# Patient Record
Sex: FEMALE | Race: WHITE
[De-identification: names, ages, dates, MRNs, and addresses within clinical notes are randomized per-mention and may not be internally consistent; named-entity substitution may affect disease eponyms.]

---

## 2018-01-01 ENCOUNTER — HOSPITAL ENCOUNTER (EMERGENCY)
Dept: HOSPITAL 62 - ER | Age: 33
Discharge: HOME | End: 2018-01-01
Payer: MEDICAID

## 2018-01-01 VITALS — SYSTOLIC BLOOD PRESSURE: 140 MMHG | DIASTOLIC BLOOD PRESSURE: 70 MMHG

## 2018-01-01 DIAGNOSIS — R05: ICD-10-CM

## 2018-01-01 DIAGNOSIS — J02.8: Primary | ICD-10-CM

## 2018-01-01 DIAGNOSIS — B97.89: ICD-10-CM

## 2018-01-01 DIAGNOSIS — R06.2: ICD-10-CM

## 2018-01-01 DIAGNOSIS — R09.82: ICD-10-CM

## 2018-01-01 DIAGNOSIS — R51: ICD-10-CM

## 2018-01-01 DIAGNOSIS — Z87.01: ICD-10-CM

## 2018-01-01 DIAGNOSIS — R68.83: ICD-10-CM

## 2018-01-01 DIAGNOSIS — J34.89: ICD-10-CM

## 2018-01-01 PROCEDURE — 71046 X-RAY EXAM CHEST 2 VIEWS: CPT

## 2018-01-01 PROCEDURE — 87070 CULTURE OTHR SPECIMN AEROBIC: CPT

## 2018-01-01 PROCEDURE — 87880 STREP A ASSAY W/OPTIC: CPT

## 2018-01-01 PROCEDURE — 99283 EMERGENCY DEPT VISIT LOW MDM: CPT

## 2018-01-01 NOTE — RADIOLOGY REPORT (SQ)
EXAM DESCRIPTION:  CHEST PA/LAT



COMPLETED DATE/TIME:  1/1/2018 1:00 pm



REASON FOR STUDY:  cough congestion fever



COMPARISON:  None.



EXAM PARAMETERS:  NUMBER OF VIEWS: two views

TECHNIQUE: Digital Frontal and Lateral radiographic views of the chest acquired.

RADIATION DOSE: NA

LIMITATIONS: none



FINDINGS:  LUNGS AND PLEURA: No opacities, masses or pneumothorax. No pleural effusion.

MEDIASTINUM AND HILAR STRUCTURES: No masses or contour abnormalities.

HEART AND VASCULAR STRUCTURES: Heart normal size.  No evidence for failure.

BONES: No acute findings.

HARDWARE: None in the chest.

OTHER: No other significant finding.



IMPRESSION:  NO SIGNIFICANT RADIOGRAPHIC FINDING IN THE CHEST.



TECHNICAL DOCUMENTATION:  JOB ID:  [id number]

 2011 [employer]- All Rights Reserved

## 2018-01-01 NOTE — ER DOCUMENT REPORT
ED ENT





- General


Chief Complaint: Cold Symptoms


Stated Complaint: COLD SYMPTOMS


Time Seen by Provider: 18 11:49


Mode of Arrival: Ambulatory


Information source: Patient


Notes: 





32-year-old female presented to ED for complaint of cough, congestion, sore 

throat, and sinus pain and pressure.  She states she has been having the 

symptoms for 4 days.  She states that her children have all been sick.  She 

states she had sinusitis a month ago.  She states she has been wheezing off and 

on.  She states she has not taken her temperature.


TRAVEL OUTSIDE OF THE [place] IN LAST 30 DAYS: No





- HPI


Patient complains to provider of: Nose problem, Throat problem, Other - Cough 

cold congestion


Onset: Other - 4 days


Onset/Duration: Gradual


Quality of pain: Achy


Severity: Moderate


Pain Level: 3


Context: Recent Illness


Location of pain: Nose, Sinus, Throat


Associated symptoms: Chills, Congestion, Cough, Runny nose, Sinus pain, Sinus 

drainage, Sore throat, Other - Body aches


Similar symptoms previously: Yes


Recently seen / treated by doctor: Yes





- Related Data


Allergies/Adverse Reactions: 


 





No Known Allergies Allergy (Verified 18 11:04)


 











Past Medical History





- General


Information source: Patient





- Social History


Smoking Status: Never Smoker


Cigarette use (# per day): No


Chew tobacco use (# tins/day): No


Smoking Education Provided: No


Frequency of alcohol use: None


Drug Abuse: None


Lives with: Family


Family History: Reviewed & Not Pertinent


Patient has suicidal ideation: No


Patient has homicidal ideation: No





- Past Medical History


Cardiac Medical History: Reports: None


Pulmonary Medical History: Reports: Hx Pneumonia


EENT Medical History: Reports: None


Neurological Medical History: Reports: None


Endocrine Medical History: Reports: None


Renal/ Medical History: Reports: Hx Ovarian Cysts


Malignancy Medical History: Reports: None


GI Medical History: Reports: None


Musculoskeltal Medical History: Reports None


Skin Medical History: Reports None


Psychiatric Medical History: Reports: None


Traumatic Medical History: Reports: None


Infectious Medical History: Reports: None


Past Surgical History: Reports: Hx  Section - x2, Hx Oral Surgery - 

[place] teeth, Hx Tubal Ligation





- Immunizations


Immunizations up to date: No


Hx Diphtheria, Pertussis, Tetanus Vaccination: No





Review of Systems





- Review of Systems


Constitutional: Chills, Recent illness


EENT: Nose discharge, Sinus pressure, Sinus discharge, Throat pain


Cardiovascular: No symptoms reported


Respiratory: Cough, Wheezing.  denies: Sputum


Gastrointestinal: No symptoms reported


Genitourinary: No symptoms reported


Female Genitourinary: No symptoms reported


Musculoskeletal: No symptoms reported


Skin: No symptoms reported


Hematologic/Lymphatic: No symptoms reported


Neurological/Psychological: Headaches


-: Yes All other systems reviewed and negative





Physical Exam





- Vital signs


Vitals: 


 











Temp Pulse Resp BP Pulse Ox


 


 98.4 F   103 H  14   144/73 H  96 


 


 18 11:07  18 11:07  18 11:07  18 11:07  18 11:07











Interpretation: Normal





- General


General appearance: Appears well, Alert





- HEENT


Head: Normocephalic, Atraumatic


Eyes: Normal


Pupils: PERRL


Ears: Normal


External canal: Normal


Tympanic membrane: Normal


Sinus: Frontal, Tenderness.  No: Redness, Swelling


Nasal: Swelling - Pale boggy nasal turbinates, Clear rhinorrhea


Mouth/Lips: Normal


Mucous membranes: Normal


Pharynx: Post nasal drainage.  No: Erythema, Exudate, Peritonsillar abscess, 

Retropharyngeal abscess, Tonsillar hypertrophy, Potential airway comprom.


Neck: Normal





- Respiratory


Respiratory status: No respiratory distress


Chest status: Nontender


Breath sounds: Nonproductive cough.  No: Productive cough, Rales, Rhonchi, 

Stridor, Wheezing


Chest palpation: Normal





- Cardiovascular


Rhythm: Regular


Heart sounds: Normal auscultation


Murmur: No





- Abdominal


Inspection: Normal


Distension: No distension


Bowel sounds: Normal


Tenderness: Nontender


Organomegaly: No organomegaly





- Back


Back: Normal, Nontender





- Extremities


General upper extremity: Normal inspection, Nontender, Normal color, Normal ROM

, Normal temperature


General lower extremity: Normal inspection, Nontender, Normal color, Normal ROM

, Normal temperature, Normal weight bearing.  No: [place]'s sign





- Neurological


Neuro grossly intact: Yes


Cognition: Normal


Orientation: AAOx4


Grzegorz Coma Scale Eye Opening: Spontaneous


[place] Coma Scale Verbal: Oriented


Grzegorz Coma Scale Motor: Obeys Commands


Grzegorz Coma Scale Total: 15


Speech: Normal


Motor strength normal: LUE, RUE, LLE, RLE


Sensory: Normal





- Psychological


Associated symptoms: Normal affect, Normal mood





- Skin


Skin Temperature: Warm


Skin Moisture: Dry


Skin Color: Normal





Course





- Re-evaluation


Re-evalutation: 





18 21:24


Assessment consistent with an upper respiratory infection.  Patient instructed 

in use of antihistamine decongestants and cough medicine for her upper 

respiratory infection.  Patient to follow-up with her primary doctor.





- Vital Signs


Vital signs: 


 











Temp Pulse Resp BP Pulse Ox


 


 98.1 F   90   14   140/70 H  100 


 


 18 13:16  18 13:16  18 13:16  18 13:16  18 13:16














- Diagnostic Test


Radiology reviewed: Image reviewed, Reports reviewed





Discharge





- Discharge


Clinical Impression: 


 Viral sore throat





URI (upper respiratory infection)


Qualifiers:


 URI type: unspecified URI Qualified Code(s): J06.9 - Acute upper respiratory 

infection, unspecified





Condition: Stable


Disposition: HOME, SELF-CARE


Additional Instructions: 


UPPER RESPIRATORY ILLNESS:





     You have a viral infection of the respiratory passages -- a "cold."  This 

common infection causes nasal congestion, drainage, and often sore throat and 

cough.  It is highly contagious.  The disease usually lasts about 10 to 14 days.


     There is no "cure" for the viral infection -- it must run its course.  If 

there is a complication, such as bacterial infection in the nose, sinuses, 

middle ear, or bronchial tubes, antibiotics may be required.  The antibiotics 

won't affect the virus.


     Drink plenty of fluids.  A humidifier may help.  An expectorant medication 

or decongestant may make you more comfortable.  Use acetaminophen or ibuprofen 

for fever or aches.


     See the doctor if fever persists over two days, if there is any 

significant worsening of your symptoms, or if you simply fail to improve as 

expected.





DECONGESTANT MEDICATION:


     A decongestant medicine has been prescribed.  Often this medicine is 

combined in the same tablet with an antihistamine or expectorant. This type of 

medicine is helpful in treating a bad cold or sinus condition, as well as in 

treatment of the nasal congestion of hay fever.  It is not of much benefit for 

lung infections.


     Decongestant medicines are related to stimulants.  They can cause an 

increase in blood pressure and heart rate.  Persons with heart disease and high 

blood pressure should not take decongestants without discussing this with the 

physician.


     If you develop palpitations, chest pain, headache, or tremors, stop the 

medicine and consult your physician.








COUGH-SUPPRESSANT & EXPECTORANT MEDICATION:


     You are to use a cough medication as needed for relief of symptoms.  This 

medicine is a combination of an expectorant (to make the mucous thinner and 

more easily "coughed up") and a cough suppressant (to reduce the frequency of 

coughing).


     The cough-suppressant medicine is related to narcotics.  You may 

experience mild nausea and sleepiness.  Some patients who are very sensitive to 

narcotics may have stomach pain from this medicine. Taking the medicine with 

food reduces these side effects.  Do not drive or work with machinery until you 

know how this medicine affects you.


     The expectorant should have no side effects.  Iodine-containing 

expectorants (such as organidin) should not be taken by persons with active 

thyroid disease unless approved by your doctor.


     Call the doctor if you develop shortness of breath, hives, rash, itching, 

lightheadedness, or severe nausea and vomiting.








USE OF ACETAMINOPHEN (Tylenol):


     Acetaminophen may be taken for pain relief or fever control. It's much 

safer than aspirin, offering a wider range of "safe" dosages.  It is safe 

during pregnancy.  Some brand names are Tylenol, Panadol, Datril, Anacin 3, 

Tempra, and Liquiprin. Acetaminophen can be repeated every four hours.  The 

following are maximum recommended dosages:


>89 pounds or adults          650 mg to 900 mg


Acetaminophen can be repeated every four hours.  Maximum dose not to exceed 

4000 mg a day.








FOLLOW-UP CARE:


If you have been referred to a physician for follow-up care, call the physician

s office for an appointment as you were instructed or within the next two days.

  If you experience worsening or a significant change in your symptoms, notify 

the physician immediately or return to the Emergency Department at any time for 

re-evaluation.





Forms:  Elevated Blood Pressure, Return to Work


Referrals: 


JULIA JUAREZ DO [Primary Care Provider] - Follow up as needed

## 2019-09-28 ENCOUNTER — HOSPITAL ENCOUNTER (EMERGENCY)
Dept: HOSPITAL 62 - ER | Age: 34
Discharge: HOME | End: 2019-09-28
Payer: MEDICAID

## 2019-09-28 VITALS — DIASTOLIC BLOOD PRESSURE: 84 MMHG | SYSTOLIC BLOOD PRESSURE: 127 MMHG

## 2019-09-28 DIAGNOSIS — M54.5: ICD-10-CM

## 2019-09-28 DIAGNOSIS — N10: Primary | ICD-10-CM

## 2019-09-28 DIAGNOSIS — R30.0: ICD-10-CM

## 2019-09-28 LAB
ADD MANUAL DIFF: NO
ALBUMIN SERPL-MCNC: 4.3 G/DL (ref 3.5–5)
ALP SERPL-CCNC: 71 U/L (ref 38–126)
ANION GAP SERPL CALC-SCNC: 11 MMOL/L (ref 5–19)
APPEARANCE UR: (no result)
APTT PPP: YELLOW S
AST SERPL-CCNC: 17 U/L (ref 14–36)
BASOPHILS # BLD AUTO: 0 10^3/UL (ref 0–0.2)
BASOPHILS NFR BLD AUTO: 0.3 % (ref 0–2)
BILIRUB DIRECT SERPL-MCNC: 0.1 MG/DL (ref 0–0.4)
BILIRUB SERPL-MCNC: 0.2 MG/DL (ref 0.2–1.3)
BILIRUB UR QL STRIP: NEGATIVE
BUN SERPL-MCNC: 8 MG/DL (ref 7–20)
CALCIUM: 9.6 MG/DL (ref 8.4–10.2)
CHLORIDE SERPL-SCNC: 102 MMOL/L (ref 98–107)
CO2 SERPL-SCNC: 27 MMOL/L (ref 22–30)
EOSINOPHIL # BLD AUTO: 0.1 10^3/UL (ref 0–0.6)
EOSINOPHIL NFR BLD AUTO: 1.3 % (ref 0–6)
ERYTHROCYTE [DISTWIDTH] IN BLOOD BY AUTOMATED COUNT: 15.6 % (ref 11.5–14)
GLUCOSE SERPL-MCNC: 110 MG/DL (ref 75–110)
GLUCOSE UR STRIP-MCNC: NEGATIVE MG/DL
HCT VFR BLD CALC: 37.8 % (ref 36–47)
HGB BLD-MCNC: 12.4 G/DL (ref 12–15.5)
KETONES UR STRIP-MCNC: NEGATIVE MG/DL
LYMPHOCYTES # BLD AUTO: 2.4 10^3/UL (ref 0.5–4.7)
LYMPHOCYTES NFR BLD AUTO: 20.8 % (ref 13–45)
MCH RBC QN AUTO: 25.4 PG (ref 27–33.4)
MCHC RBC AUTO-ENTMCNC: 32.7 G/DL (ref 32–36)
MCV RBC AUTO: 78 FL (ref 80–97)
MONOCYTES # BLD AUTO: 0.6 10^3/UL (ref 0.1–1.4)
MONOCYTES NFR BLD AUTO: 5.5 % (ref 3–13)
NEUTROPHILS # BLD AUTO: 8.2 10^3/UL (ref 1.7–8.2)
NEUTS SEG NFR BLD AUTO: 72.1 % (ref 42–78)
NITRITE UR QL STRIP: NEGATIVE
PH UR STRIP: 5 [PH] (ref 5–9)
PLATELET # BLD: 347 10^3/UL (ref 150–450)
POTASSIUM SERPL-SCNC: 4.1 MMOL/L (ref 3.6–5)
PROT SERPL-MCNC: 6.9 G/DL (ref 6.3–8.2)
PROT UR STRIP-MCNC: NEGATIVE MG/DL
RBC # BLD AUTO: 4.87 10^6/UL (ref 3.72–5.28)
SP GR UR STRIP: 1.02
TOTAL CELLS COUNTED % (AUTO): 100 %
UROBILINOGEN UR-MCNC: NEGATIVE MG/DL (ref ?–2)
WBC # BLD AUTO: 11.4 10^3/UL (ref 4–10.5)

## 2019-09-28 PROCEDURE — 99283 EMERGENCY DEPT VISIT LOW MDM: CPT

## 2019-09-28 PROCEDURE — 85025 COMPLETE CBC W/AUTO DIFF WBC: CPT

## 2019-09-28 PROCEDURE — 36415 COLL VENOUS BLD VENIPUNCTURE: CPT

## 2019-09-28 PROCEDURE — 84703 CHORIONIC GONADOTROPIN ASSAY: CPT

## 2019-09-28 PROCEDURE — 81001 URINALYSIS AUTO W/SCOPE: CPT

## 2019-09-28 PROCEDURE — 96374 THER/PROPH/DIAG INJ IV PUSH: CPT

## 2019-09-28 PROCEDURE — 96361 HYDRATE IV INFUSION ADD-ON: CPT

## 2019-09-28 PROCEDURE — 80053 COMPREHEN METABOLIC PANEL: CPT

## 2019-09-28 NOTE — ER DOCUMENT REPORT
ED Medical Screen (RME)





- General


Chief Complaint: Low Back Pain


Stated Complaint: BACK PAIN


Time Seen by Provider: 19 17:26


Primary Care Provider: 


JULIA JUAREZ DO [Primary Care Provider] - Follow up as needed


Information source: Patient


Notes: 





Patient presents complaining of bilateral flank pain for the past week.  Patient

denies any fever nausea or vomiting.  Patient does complain of dizziness as well

as dysuria.





I have greeted and performed a rapid initial assessment of this patient.  A 

comprehensive ED assessment and evaluation of the patient, analysis of test 

results and completion of the medical decision making process will be conducted 

by additional ED providers.


TRAVEL OUTSIDE OF THE [place] IN LAST 30 DAYS: No





- Related Data


Allergies/Adverse Reactions: 


                                        





No Known Allergies Allergy (Verified 18 11:04)


   











Past Medical History


Pulmonary Medical History: Reports: Hx Pneumonia


Renal/ Medical History: Reports: Hx Ovarian Cysts.  Denies: Hx Peritoneal 

Dialysis


Past Surgical History: Reports: Hx  Section - x2, Hx Oral Surgery - 

[place] teeth, Hx Tubal Ligation





- Immunizations


Immunizations up to date: No


Hx Diphtheria, Pertussis, Tetanus Vaccination: No





Physical Exam





- Vital signs


Vitals: 





                                        











Temp Pulse Resp BP Pulse Ox


 


 99.0 F   106 H  16   158/93 H  100 


 


 19 16:30  19 16:30  19 16:30  19 16:30  19 16:30














- Back


Back: CVA tenderness - Bilateral





Course





- Vital Signs


Vital signs: 





                                        











Temp Pulse Resp BP Pulse Ox


 


 99.0 F   106 H  16   158/93 H  100 


 


 19 16:30  19 16:30  19 16:30  19 16:30  19 16:30














Doctor's Discharge





- Discharge


Referrals: 


JULIA JUAREZ DO [Primary Care Provider] - Follow up as needed

## 2019-09-28 NOTE — ER DOCUMENT REPORT
ED General





- General


Chief Complaint: Low Back Pain


Stated Complaint: BACK PAIN


Time Seen by Provider: 19 17:26


Primary Care Provider: 


JULIA JUAREZ DO [Primary Care Provider] - Follow up as needed


Notes: 





Patient is a 34-year-old female presents to the emergency department for dysuria

and lower back pain for approximately a week.  Patient's denying any nausea, 

vomiting, fevers.  Patient's denying any vaginal discharge.  Patient denies any 

abdominal pain or suprapubic pain.


TRAVEL OUTSIDE OF THE [place] IN LAST 30 DAYS: No





- Related Data


Allergies/Adverse Reactions: 


                                        





No Known Allergies Allergy (Verified 18 11:04)


   











Past Medical History





- General


Information source: Patient





- Social History


Smoking Status: Never Smoker


Chew tobacco use (# tins/day): No


Frequency of alcohol use: None


Drug Abuse: None


Family History: Reviewed & Not Pertinent


Patient has suicidal ideation: No


Patient has homicidal ideation: No


Pulmonary Medical History: Reports: Hx Pneumonia


Renal/ Medical History: Reports: Hx Ovarian Cysts.  Denies: Hx Peritoneal 

Dialysis


Past Surgical History: Reports: Hx  Section - x2, Hx Oral Surgery - 

[place] teeth, Hx Tubal Ligation





- Immunizations


Immunizations up to date: No


Hx Diphtheria, Pertussis, Tetanus Vaccination: No





Review of Systems





- Review of Systems


Constitutional: denies: Fever


EENT: No symptoms reported


Cardiovascular: No symptoms reported


Respiratory: No symptoms reported


Gastrointestinal: See HPI


Genitourinary: See HPI


Female Genitourinary: No symptoms reported


Musculoskeletal: See HPI


Skin: No symptoms reported


Hematologic/Lymphatic: No symptoms reported


Neurological/Psychological: No symptoms reported





Physical Exam





- Vital signs


Vitals: 





                                        











Temp Pulse Resp BP Pulse Ox


 


 99.0 F   106 H  16   158/93 H  100 


 


 19 16:30  19 16:30  19 16:30  19 16:30  19 16:30














- Notes


Notes: 





GENERAL: Alert, interacts well. No acute distress.


HEAD: Normocephalic, atraumatic.


EYES: Pupils equal, round, and reactive to light. Extraocular movements intact.


ENT: Oral mucosa moist, tongue midline. 


NECK: Full range of motion. Supple. Trachea midline.


LUNGS: Clear to auscultation bilaterally, no wheezes, rales, or rhonchi. No 

respiratory distress.


HEART: Regular rate and rhythm. No murmur


ABDOMEN: Soft, non-tender. Non-distended. Bowel sounds present in all 4 

quadrants.


EXTREMITIES: Moves all 4 extremities spontaneously. No edema, normal radial and 

dorsalis pedis pulses bilaterally. No cyanosis.


BACK: no cervical, thoracic, lumbar midline tenderness. No saddle anesthesia, 

normal distal neurovascular exam.  Slight CVA tenderness noted bilaterally.


NEUROLOGICAL: Alert and oriented x3. Normal speech. cranial nerves II through 

XII grossly intact 


PSYCH: Normal affect, normal mood.


SKIN: Warm, dry, normal turgor. No rashes or lesions noted.








Course





- Re-evaluation


Re-evalutation: 





19 19:18





Laboratory











  19





  17:54 17:54 17:54


 


WBC  11.4 H  


 


RBC  4.87  


 


Hgb  12.4  


 


Hct  37.8  


 


MCV  78 L  


 


MCH  25.4 L  


 


MCHC  32.7  


 


RDW  15.6 H  


 


Plt Count  347  


 


Lymph % (Auto)  20.8  


 


Mono % (Auto)  5.5  


 


Eos % (Auto)  1.3  


 


Baso % (Auto)  0.3  


 


Absolute Neuts (auto)  8.2  


 


Absolute Lymphs (auto)  2.4  


 


Absolute Monos (auto)  0.6  


 


Absolute Eos (auto)  0.1  


 


Absolute Basos (auto)  0.0  


 


Seg Neutrophils %  72.1  


 


Sodium   140.2 


 


Potassium   4.1 


 


Chloride   102 


 


Carbon Dioxide   27 


 


Anion Gap   11 


 


BUN   8 


 


Creatinine   0.69 


 


Est GFR ( Amer)   > 60 


 


Est GFR (MDRD) Non-Af   > 60 


 


Glucose   110 


 


Calcium   9.6 


 


Total Bilirubin   0.2 


 


Direct Bilirubin   0.1 


 


Neonat Total Bilirubin   Not Reportable 


 


Neonat Direct Bilirubin   Not Reportable 


 


Neonat Indirect Bili   Not Reportable 


 


AST   17 


 


ALT   16 


 


Alkaline Phosphatase   71 


 


Total Protein   6.9 


 


Albumin   4.3 


 


Serum HCG, Qual    NEGATIVE


 


Urine Color   


 


Urine Appearance   


 


Urine pH   


 


Ur Specific Gravity   


 


Urine Protein   


 


Urine Glucose (UA)   


 


Urine Ketones   


 


Urine Blood   


 


Urine Nitrite   


 


Urine Bilirubin   


 


Urine Urobilinogen   


 


Ur Leukocyte Esterase   


 


Urine WBC (Auto)   


 


Urine RBC (Auto)   


 


Urine Bacteria (Auto)   


 


Squamous Epi Cells Auto   


 


Urine Mucus (Auto)   


 


Urine Ascorbic Acid   














  19





  17:54


 


WBC 


 


RBC 


 


Hgb 


 


Hct 


 


MCV 


 


MCH 


 


MCHC 


 


RDW 


 


Plt Count 


 


Lymph % (Auto) 


 


Mono % (Auto) 


 


Eos % (Auto) 


 


Baso % (Auto) 


 


Absolute Neuts (auto) 


 


Absolute Lymphs (auto) 


 


Absolute Monos (auto) 


 


Absolute Eos (auto) 


 


Absolute Basos (auto) 


 


Seg Neutrophils % 


 


Sodium 


 


Potassium 


 


Chloride 


 


Carbon Dioxide 


 


Anion Gap 


 


BUN 


 


Creatinine 


 


Est GFR ( Amer) 


 


Est GFR (MDRD) Non-Af 


 


Glucose 


 


Calcium 


 


Total Bilirubin 


 


Direct Bilirubin 


 


Neonat Total Bilirubin 


 


Neonat Direct Bilirubin 


 


Neonat Indirect Bili 


 


AST 


 


ALT 


 


Alkaline Phosphatase 


 


Total Protein 


 


Albumin 


 


Serum HCG, Qual 


 


Urine Color  YELLOW


 


Urine Appearance  SLIGHTLY-CLOUDY


 


Urine pH  5.0


 


Ur Specific Gravity  1.016


 


Urine Protein  NEGATIVE


 


Urine Glucose (UA)  NEGATIVE


 


Urine Ketones  NEGATIVE


 


Urine Blood  NEGATIVE


 


Urine Nitrite  NEGATIVE


 


Urine Bilirubin  NEGATIVE


 


Urine Urobilinogen  NEGATIVE


 


Ur Leukocyte Esterase  SMALL H


 


Urine WBC (Auto)  66


 


Urine RBC (Auto)  3


 


Urine Bacteria (Auto)  TRACE


 


Squamous Epi Cells Auto  2


 


Urine Mucus (Auto)  RARE


 


Urine Ascorbic Acid  NEGATIVE











Patient's urine does show signs of infection, sent for culture.  Treated 

initially with ceftriaxone based on her bilateral CVA tenderness.  Patient has 

had no episodes of vomiting, is non-tachycardic, afebrile, stable for discharge.





At this time will discharge with return precautions and follow-up 

recommendations.  Verbal discharge instructions given a the bedside and 

opportunity for questions given. Medication warnings reviewed. Patient is in 

agreement with this plan and has verbalized understanding of return precautions 

and the need for primary care follow-up in the next 24-72 hours.





This medical record was dictated with voice recognizing software.  There may be 

grammatical, syntax errors that are unintended.





- Vital Signs


Vital signs: 





                                        











Temp Pulse Resp BP Pulse Ox


 


 99.0 F   106 H  16   158/93 H  100 


 


 19 16:30  19 16:30  19 16:30  19 16:30  19 16:30














- Laboratory


Result Diagrams: 


                                 19 17:54





                                 19 17:54


Laboratory results interpreted by me: 





                                        











  19





  17:54 17:54


 


WBC  11.4 H 


 


MCV  78 L 


 


MCH  25.4 L 


 


RDW  15.6 H 


 


Ur Leukocyte Esterase   SMALL H














Discharge





- Discharge


Clinical Impression: 


Urinary tract infection


Qualifiers:


 Urinary tract infection type: acute pyelonephritis Qualified Code(s): N10 - 

Acute pyelonephritis





Condition: Stable


Disposition: HOME, SELF-CARE


Instructions:  Urinary Tract Infection ([facility]), Cephalexin ([facility])


Additional Instructions: 


As we discussed you have been seen and treated in the emergency department for a

urinary tract infection.  Please make sure you take antibiotics as prescribed.  

Please also make sure you stay well-hydrated.  Follow-up with your primary care 

provider in the next 24 to 48 hours.  Return to the emergency room for any 

concerns.


Prescriptions: 


Cephalexin Monohydrate [Keflex 500 mg Capsule] 500 mg PO BID 7 Days #14 capsule


Forms:  Return to Work


Referrals: 


JULIA JUAREZ DO [Primary Care Provider] - Follow up as needed